# Patient Record
Sex: MALE | Race: WHITE | URBAN - METROPOLITAN AREA
[De-identification: names, ages, dates, MRNs, and addresses within clinical notes are randomized per-mention and may not be internally consistent; named-entity substitution may affect disease eponyms.]

---

## 2023-06-28 ENCOUNTER — OFFICE VISIT (OUTPATIENT)
Facility: LOCATION | Age: 58
End: 2023-06-28

## 2023-06-28 DIAGNOSIS — E11.3293 TYPE 2 DIAB WITH MILD NONP RTNOP WITHOUT MACULAR EDEMA, BI: ICD-10-CM

## 2023-06-28 DIAGNOSIS — H34.8110 CENTRAL RETINAL VEIN OCCLUSION W/ MACULAR EDEMA, RIGHT EYE: ICD-10-CM

## 2023-06-28 DIAGNOSIS — H25.13 AGE-RELATED NUCLEAR CATARACT, BILATERAL: ICD-10-CM

## 2023-06-28 DIAGNOSIS — H40.1133 PRIMARY OPEN-ANGLE GLAUCOMA, SEVERE STAGE, BILATERAL: Primary | ICD-10-CM

## 2023-06-28 PROCEDURE — 92020 GONIOSCOPY: CPT | Performed by: OPHTHALMOLOGY

## 2023-06-28 PROCEDURE — 92014 COMPRE OPH EXAM EST PT 1/>: CPT | Performed by: OPHTHALMOLOGY

## 2023-06-28 ASSESSMENT — INTRAOCULAR PRESSURE
OS: 12
OD: 50

## 2023-06-28 NOTE — IMPRESSION/PLAN
Impression: Central retinal vein occlusion w/ macular edema, right eye: H34.8110. Plan:  Will monitor

## 2023-06-28 NOTE — IMPRESSION/PLAN
Impression: Primary open-angle glaucoma, severe stage, bilateral: H40.1133. Plan: Recommend yearly OCT of the nerves, Visual Fields, and ON Photos. Careful observation of intraocular pressures, anatomical, and functional tests are recommended. Patient understands that permanent blindness can occur without adequate pressure control and observation. Compliance is strongly urged. The patient is to call back with any worsening of symptoms or concerns. Continue Dorzolamide Bid, Latanoprost HS, Timolol BID and Alphagan BID all to OU. Adviced against driving at night and limit driving during the day.

## 2023-06-28 NOTE — IMPRESSION/PLAN
Impression: Type 2 diab with mild nonp rtnop without macular edema, bi: C31.4917. Plan:  Discussed the pathophysiology of diabetes and its effect on the eye. Stressed the importance of strong glucose control. Advised of importance of at least yearly dilated examinations, but to contact us immediately for any problems or concerns.